# Patient Record
Sex: FEMALE | Race: BLACK OR AFRICAN AMERICAN | NOT HISPANIC OR LATINO | URBAN - METROPOLITAN AREA
[De-identification: names, ages, dates, MRNs, and addresses within clinical notes are randomized per-mention and may not be internally consistent; named-entity substitution may affect disease eponyms.]

---

## 2019-05-05 ENCOUNTER — EMERGENCY (EMERGENCY)
Facility: HOSPITAL | Age: 34
LOS: 1 days | Discharge: HOME | End: 2019-05-05
Admitting: EMERGENCY MEDICINE
Payer: COMMERCIAL

## 2019-05-05 VITALS
SYSTOLIC BLOOD PRESSURE: 132 MMHG | HEART RATE: 77 BPM | TEMPERATURE: 97 F | RESPIRATION RATE: 18 BRPM | DIASTOLIC BLOOD PRESSURE: 97 MMHG | OXYGEN SATURATION: 100 % | WEIGHT: 128.09 LBS

## 2019-05-05 VITALS
SYSTOLIC BLOOD PRESSURE: 131 MMHG | TEMPERATURE: 97 F | OXYGEN SATURATION: 100 % | RESPIRATION RATE: 18 BRPM | HEART RATE: 78 BPM | DIASTOLIC BLOOD PRESSURE: 88 MMHG

## 2019-05-05 PROCEDURE — 70450 CT HEAD/BRAIN W/O DYE: CPT | Mod: 26

## 2019-05-05 PROCEDURE — 73080 X-RAY EXAM OF ELBOW: CPT | Mod: 26,LT

## 2019-05-05 PROCEDURE — 99284 EMERGENCY DEPT VISIT MOD MDM: CPT | Mod: 25

## 2019-05-05 RX ORDER — ONDANSETRON 8 MG/1
4 TABLET, FILM COATED ORAL ONCE
Qty: 0 | Refills: 0 | Status: COMPLETED | OUTPATIENT
Start: 2019-05-05 | End: 2019-05-05

## 2019-05-05 RX ADMIN — ONDANSETRON 4 MILLIGRAM(S): 8 TABLET, FILM COATED ORAL at 14:48

## 2019-05-05 NOTE — ED PROVIDER NOTE - CARE PROVIDER_API CALL
Lloyd Choi)  Orthopaedic Surgery  [address] [place] [address]  Phone: [phone]  Fax: [phone]  Follow Up Time: Lloyd Choi)  Orthopaedic Surgery  [address] [place] [id number]  Phone: [phone]  Fax: [phone]  Follow Up Time:     Peri Castillo)  Neurology  [address] [place] [address]  Phone: [phone]  Fax: [phone]  Follow Up Time:

## 2019-05-05 NOTE — ED PROVIDER NOTE - PHYSICAL EXAMINATION
CONSTITUTIONAL: Well-developed; well-nourished; in no acute distress.  	SKIN: Skin is warm and dry, no acute rash.  	HEAD: Normocephalic; atraumatic.  	EYES: PERRL, EOM intact; conjunctiva and sclera clear.  	ENT: No nasal discharge; airway clear.  no tonsillar swelling or exudates, uvula midline, airway patent. tympanic membranes clear bilaterally, No redness, normal landmarks and light reflexes, canal clear without cerumen impaction, no hemotympanum bilaterally  	NECK: Supple; non tender. no midline tenderness, good ROM neck  	CARD: S1, S2 normal; no murmurs, gallops, or rubs. Regular rate and rhythm.  	RESP: No wheezes, rales or rhonchi.  	ABD:; soft; non-distended; non-tender  	EXT: Normal ROM x 4.  	NEURO: Patient is alert, oriented x person, place and time.  Cranial nerves 2-12 are intact.  Normal gait and speech.  Cerebellar testing normal:  negative Romberg, normal coordination and normal finger to nose, heal to shin and rapid alternating movements.  Normal sensory exam throughout.  No pronator drift.  5/5 bl upper extremity and lower extremity strength. Visual fields intact   PSYCH: Cooperative, appropriate.

## 2019-05-05 NOTE — ED PROVIDER NOTE - NSFOLLOWUPINSTRUCTIONS_ED_ALL_ED_FT
Take Ibuprofen 600mg every 6-8 hours as needed for pain, take with food, and in addition you may take Tylenol 500 mg every 6-8 hours as needed for pain  Rest. Cool compresses.  Extremity elevation.  Ace wrap    RETURN TO THE EMERGENCY DEPARTMENT IF CONFUSION, WORSENING HEADACHE, PERSISTENT VOMITING, WORSENING PAIN/SWELLING OR ANY CONCERNS.

## 2019-05-05 NOTE — ED ADULT NURSE NOTE - NSIMPLEMENTINTERV_GEN_ALL_ED
Implemented All Fall Risk Interventions:  [place] to call system. Call bell, personal items and telephone within reach. Instruct patient to call for assistance. Room bathroom lighting operational. Non-slip footwear when patient is off stretcher. Physically safe environment: no spills, clutter or unnecessary equipment. Stretcher in lowest position, wheels locked, appropriate side rails in place. Provide visual cue, wrist band, yellow gown, etc. Monitor gait and stability. Monitor for mental status changes and reorient to person, place, and time. Review medications for side effects contributing to fall risk. Reinforce activity limits and safety measures with patient and family.

## 2019-05-05 NOTE — ED PROVIDER NOTE - PROVIDER TOKENS
PROVIDER:[TOKEN:[[id number]]] PROVIDER:[TOKEN:[[id number]]],PROVIDER:[TOKEN:[[id number]]]
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
## 2019-05-05 NOTE — ED PROVIDER NOTE - DIAGNOSTIC INTERPRETATION
Interpreted by LESTER DURAN elbow xrays 3 views  No fracture, no dislocation (joint spaces grossly normal), no Foreign Body noted, soft tissue swelling.
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
## 2019-05-09 DIAGNOSIS — S09.90XA UNSPECIFIED INJURY OF HEAD, INITIAL ENCOUNTER: ICD-10-CM
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
## 2019-05-09 DIAGNOSIS — M25.522 PAIN IN LEFT ELBOW: ICD-10-CM
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
## 2020-08-25 ENCOUNTER — APPOINTMENT (RX ONLY)
Dept: URBAN - NONMETROPOLITAN AREA CLINIC 1 | Facility: CLINIC | Age: 35
Setting detail: DERMATOLOGY
End: 2020-08-25

## 2020-08-25 VITALS — TEMPERATURE: 96.8 F

## 2020-08-25 DIAGNOSIS — L70.0 ACNE VULGARIS: ICD-10-CM

## 2020-08-25 DIAGNOSIS — L72.8 OTHER FOLLICULAR CYSTS OF THE SKIN AND SUBCUTANEOUS TISSUE: ICD-10-CM

## 2020-08-25 DIAGNOSIS — Z12.83 ENCOUNTER FOR SCREENING FOR MALIGNANT NEOPLASM OF SKIN: ICD-10-CM

## 2020-08-25 PROBLEM — D23.71 OTHER BENIGN NEOPLASM OF SKIN OF RIGHT LOWER LIMB, INCLUDING HIP: Status: ACTIVE | Noted: 2020-08-25

## 2020-08-25 PROCEDURE — 99203 OFFICE O/P NEW LOW 30 MIN: CPT

## 2020-08-25 PROCEDURE — ? COUNSELING

## 2020-08-25 PROCEDURE — ? OBSERVATION

## 2020-08-25 ASSESSMENT — LOCATION SIMPLE DESCRIPTION DERM
LOCATION SIMPLE: LEFT AXILLARY VAULT
LOCATION SIMPLE: CHEST
LOCATION SIMPLE: RIGHT UPPER BACK
LOCATION SIMPLE: RIGHT EYEBROW

## 2020-08-25 ASSESSMENT — LOCATION ZONE DERM
LOCATION ZONE: FACE
LOCATION ZONE: TRUNK
LOCATION ZONE: AXILLAE

## 2020-08-25 ASSESSMENT — LOCATION DETAILED DESCRIPTION DERM
LOCATION DETAILED: RIGHT LATERAL EYEBROW
LOCATION DETAILED: LEFT MEDIAL SUPERIOR CHEST
LOCATION DETAILED: RIGHT SUPERIOR MEDIAL UPPER BACK
LOCATION DETAILED: LEFT AXILLARY VAULT

## 2020-08-25 NOTE — PROCEDURE: COUNSELING
Detail Level: Generalized
Detail Level: Simple
Topical Retinoid Pregnancy And Lactation Text: This medication is Pregnancy Category C. It is unknown if this medication is excreted in breast milk.
Doxycycline Counseling:  Patient counseled regarding possible photosensitivity and increased risk for sunburn.  Patient instructed to avoid sunlight, if possible.  When exposed to sunlight, patients should wear protective clothing, sunglasses, and sunscreen.  The patient was instructed to call the office immediately if the following severe adverse effects occur:  hearing changes, easy bruising/bleeding, severe headache, or vision changes.  The patient verbalized understanding of the proper use and possible adverse effects of doxycycline.  All of the patient's questions and concerns were addressed.
Erythromycin Counseling:  I discussed with the patient the risks of erythromycin including but not limited to GI upset, allergic reaction, drug rash, diarrhea, increase in liver enzymes, and yeast infections.
High Dose Vitamin A Counseling: Side effects reviewed, pt to contact office should one occur.
Include Pregnancy/Lactation Warning?: No
Tetracycline Counseling: Patient counseled regarding possible photosensitivity and increased risk for sunburn.  Patient instructed to avoid sunlight, if possible.  When exposed to sunlight, patients should wear protective clothing, sunglasses, and sunscreen.  The patient was instructed to call the office immediately if the following severe adverse effects occur:  hearing changes, easy bruising/bleeding, severe headache, or vision changes.  The patient verbalized understanding of the proper use and possible adverse effects of tetracycline.  All of the patient's questions and concerns were addressed. Patient understands to avoid pregnancy while on therapy due to potential birth defects.
Azithromycin Counseling:  I discussed with the patient the risks of azithromycin including but not limited to GI upset, allergic reaction, drug rash, diarrhea, and yeast infections.
Sarecycline Counseling: Patient advised regarding possible photosensitivity and discoloration of the teeth, skin, lips, tongue and gums.  Patient instructed to avoid sunlight, if possible.  When exposed to sunlight, patients should wear protective clothing, sunglasses, and sunscreen.  The patient was instructed to call the office immediately if the following severe adverse effects occur:  hearing changes, easy bruising/bleeding, severe headache, or vision changes.  The patient verbalized understanding of the proper use and possible adverse effects of sarecycline.  All of the patient's questions and concerns were addressed.
Topical Clindamycin Pregnancy And Lactation Text: This medication is Pregnancy Category B and is considered safe during pregnancy. It is unknown if it is excreted in breast milk.
High Dose Vitamin A Pregnancy And Lactation Text: High dose vitamin A therapy is contraindicated during pregnancy and breast feeding.
Detail Level: Zone
Topical Sulfur Applications Counseling: Topical Sulfur Counseling: Patient counseled that this medication may cause skin irritation or allergic reactions.  In the event of skin irritation, the patient was advised to reduce the amount of the drug applied or use it less frequently.   The patient verbalized understanding of the proper use and possible adverse effects of topical sulfur application.  All of the patient's questions and concerns were addressed.
Benzoyl Peroxide Counseling: Patient counseled that medicine may cause skin irritation and bleach clothing.  In the event of skin irritation, the patient was advised to reduce the amount of the drug applied or use it less frequently.   The patient verbalized understanding of the proper use and possible adverse effects of benzoyl peroxide.  All of the patient's questions and concerns were addressed.
Tazorac Counseling:  Patient advised that medication is irritating and drying.  Patient may need to apply sparingly and wash off after an hour before eventually leaving it on overnight.  The patient verbalized understanding of the proper use and possible adverse effects of tazorac.  All of the patient's questions and concerns were addressed.
Erythromycin Pregnancy And Lactation Text: This medication is Pregnancy Category B and is considered safe during pregnancy. It is also excreted in breast milk.
Birth Control Pills Counseling: Birth Control Pill Counseling: I discussed with the patient the potential side effects of OCPs including but not limited to increased risk of stroke, heart attack, thrombophlebitis, deep venous thrombosis, hepatic adenomas, breast changes, GI upset, headaches, and depression.  The patient verbalized understanding of the proper use and possible adverse effects of OCPs. All of the patient's questions and concerns were addressed.
Tetracycline Pregnancy And Lactation Text: This medication is Pregnancy Category D and not consider safe during pregnancy. It is also excreted in breast milk.
Azithromycin Pregnancy And Lactation Text: This medication is considered safe during pregnancy and is also secreted in breast milk.
Isotretinoin Counseling: Patient should get monthly blood tests, not donate blood, not drive at night if vision affected, not share medication, and not undergo elective surgery for 6 months after tx completed. Side effects reviewed, pt to contact office should one occur.
Dapsone Counseling: I discussed with the patient the risks of dapsone including but not limited to hemolytic anemia, agranulocytosis, rashes, methemoglobinemia, kidney failure, peripheral neuropathy, headaches, GI upset, and liver toxicity.  Patients who start dapsone require monitoring including baseline LFTs and weekly CBCs for the first month, then every month thereafter.  The patient verbalized understanding of the proper use and possible adverse effects of dapsone.  All of the patient's questions and concerns were addressed.
Tazorac Pregnancy And Lactation Text: This medication is not safe during pregnancy. It is unknown if this medication is excreted in breast milk.
Bactrim Counseling:  I discussed with the patient the risks of sulfa antibiotics including but not limited to GI upset, allergic reaction, drug rash, diarrhea, dizziness, photosensitivity, and yeast infections.  Rarely, more serious reactions can occur including but not limited to aplastic anemia, agranulocytosis, methemoglobinemia, blood dyscrasias, liver or kidney failure, lung infiltrates or desquamative/blistering drug rashes.
Spironolactone Counseling: Patient advised regarding risks of diarrhea, abdominal pain, hyperkalemia, birth defects (for female patients), liver toxicity and renal toxicity. The patient may need blood work to monitor liver and kidney function and potassium levels while on therapy. The patient verbalized understanding of the proper use and possible adverse effects of spironolactone.  All of the patient's questions and concerns were addressed.
Topical Sulfur Applications Pregnancy And Lactation Text: This medication is Pregnancy Category C and has an unknown safety profile during pregnancy. It is unknown if this topical medication is excreted in breast milk.
Minocycline Counseling: Patient advised regarding possible photosensitivity and discoloration of the teeth, skin, lips, tongue and gums.  Patient instructed to avoid sunlight, if possible.  When exposed to sunlight, patients should wear protective clothing, sunglasses, and sunscreen.  The patient was instructed to call the office immediately if the following severe adverse effects occur:  hearing changes, easy bruising/bleeding, severe headache, or vision changes.  The patient verbalized understanding of the proper use and possible adverse effects of minocycline.  All of the patient's questions and concerns were addressed.
Birth Control Pills Pregnancy And Lactation Text: This medication should be avoided if pregnant and for the first 30 days post-partum.
Topical Clindamycin Counseling: Patient counseled that this medication may cause skin irritation or allergic reactions.  In the event of skin irritation, the patient was advised to reduce the amount of the drug applied or use it less frequently.   The patient verbalized understanding of the proper use and possible adverse effects of clindamycin.  All of the patient's questions and concerns were addressed.
Isotretinoin Pregnancy And Lactation Text: This medication is Pregnancy Category X and is considered extremely dangerous during pregnancy. It is unknown if it is excreted in breast milk.
Dapsone Pregnancy And Lactation Text: This medication is Pregnancy Category C and is not considered safe during pregnancy or breast feeding.
Doxycycline Pregnancy And Lactation Text: This medication is Pregnancy Category D and not consider safe during pregnancy. It is also excreted in breast milk but is considered safe for shorter treatment courses.
Topical Retinoid counseling:  Patient advised to apply a pea-sized amount only at bedtime and wait 30 minutes after washing their face before applying.  If too drying, patient may add a non-comedogenic moisturizer. The patient verbalized understanding of the proper use and possible adverse effects of retinoids.  All of the patient's questions and concerns were addressed.
Benzoyl Peroxide Pregnancy And Lactation Text: This medication is Pregnancy Category C. It is unknown if benzoyl peroxide is excreted in breast milk.
Bactrim Pregnancy And Lactation Text: This medication is Pregnancy Category D and is known to cause fetal risk.  It is also excreted in breast milk.
Spironolactone Pregnancy And Lactation Text: This medication can cause feminization of the male fetus and should be avoided during pregnancy. The active metabolite is also found in breast milk.

## 2020-09-04 ENCOUNTER — APPOINTMENT (RX ONLY)
Dept: URBAN - NONMETROPOLITAN AREA CLINIC 1 | Facility: CLINIC | Age: 35
Setting detail: DERMATOLOGY
End: 2020-09-04

## 2020-09-04 VITALS — TEMPERATURE: 98.1 F

## 2020-09-04 DIAGNOSIS — B00.1 HERPESVIRAL VESICULAR DERMATITIS: ICD-10-CM

## 2020-09-04 DIAGNOSIS — L90.5 SCAR CONDITIONS AND FIBROSIS OF SKIN: ICD-10-CM

## 2020-09-04 PROCEDURE — ? MICRONEEDLING

## 2020-09-04 PROCEDURE — ? ADDITIONAL NOTES

## 2020-09-04 PROCEDURE — ? COUNSELING

## 2020-09-04 PROCEDURE — ? PRESCRIPTION

## 2020-09-04 RX ORDER — VALACYCLOVIR 1 G/1
2 TABLET ORAL BID
Qty: 30 | Refills: 3 | Status: ERX | COMMUNITY
Start: 2020-09-04

## 2020-09-04 RX ADMIN — VALACYCLOVIR 2: 1 TABLET ORAL at 00:00

## 2020-09-04 ASSESSMENT — LOCATION SIMPLE DESCRIPTION DERM
LOCATION SIMPLE: RIGHT LIP
LOCATION SIMPLE: LEFT LIP

## 2020-09-04 ASSESSMENT — LOCATION DETAILED DESCRIPTION DERM
LOCATION DETAILED: RIGHT INFERIOR VERMILION LIP
LOCATION DETAILED: LEFT INFERIOR VERMILION LIP

## 2020-09-04 ASSESSMENT — LOCATION ZONE DERM: LOCATION ZONE: LIP

## 2020-09-04 NOTE — PROCEDURE: ADDITIONAL NOTES
Detail Level: Simple
Additional Notes: Skin Pen Microneedling Treatment\\nPre and Post Procedure Instructions\\nPre-Procedure\\nFor best results and minimal downtime, it is recommended that the patient pre-treat their skin twice per day for up to two weeks with Alastin Skin Regenerating Nectar.  The patient will continue to use the Nectar for up to two weeks post treatment for best results.  \\nPrior to procedure precautions: \\n• Avoid excessive sun exposure/sunburns 24 hours prior to procedure\\n• Discontinue use of topical retinoids 72 hours prior to procedure\\n• Allow at least 24 hours after autoimmune therapies before Skin Pen treatment\\n• Wait 6 months following oral isotretinoin use\\n• If you experience cold sores please inform your practitioner; we can provide you with a prescription anti-viral medication to suppress the possibility of an outbreak\\nDuring the Procedure\\n1. The patient’s skin will be prepped with topical numbing agent. \\n2. Patient’s skin will be cleansed and a glide product applied to the skin for treatment. \\n3. Treatment with the microneedling device will take approximately 30 to 45 minutes. \\n4. Alastin Skin Regenerating Nectar will be applied to the skin post treatment. \\nPost Procedure Instructions and Expectations\\n It is important the patient understands and follows the post procedure instructions.  Microneedling creates “micro channels” where the tiny needle has entered and exited the skin creating an injury.  The micro-channels remain open for the first 24 hours post treatment.  During this 24 hour period, it of the upmost importance the patient applies the correct skin care to avoid complications.  If you wish, you may bring your products into the office so we may determine if they are safe for use.\\nListed below are product ingredients which the patient SHOULD NOT APPLY to their skin while the micro-channels are open:\\nProducts to AVOID for the first 24 hours post treatment: \\n• Silicone\\n• Dimethicone\\n• Methicone\\n• Dyes\\n• Fragrance\\n• Petrolatum or Petroleum \\n• Glycol or propylene glycol\\n• Mineral oil\\n• Sulphates, such as Sodium Millie Sulphates or derivatives\\n• Parabens\\n• Citrus-derived preservatives\\n• Formaldehyde releasing preservatives\\n \\nProducts to consider for optimal results post treatment and are APPROVED for the application in the first 24 hours: \\n• Vitamin C\\n• Vitamin E\\n• Peptides\\n• Hyaluronic Acid\\n• Magnesium\\n• Growth Factors\\nPost Procedure Home Care\\nThe First 24 hours Post Treatment\\n1. WASH with a gentle cleanser and water, morning and night, lightly pat skin dry. \\n2. APPLY only Alastin [place] or other approved skin care for the first 24 hours post procedure. \\n3. MOISTURIZE with a soothing balm or appropriate moisturizer after the Nectar product.  \\na. You may reapply the moisturizer or soothing balm multiple times per day to soothe irritation and promote moisture in the skin. \\n4. AVOID sun for the first 24 hours post treatment.  \\n5. AVOID Strenuous exercise or excessive perspiration 24-48 hours post treatment.  \\na. Excessive blood flow and sweat can cause irritation and discomfort to the treatment area. \\n48 Hours to 2 Weeks Post Treatment\\n1. WASH with a gentle cleanser and water, morning and night, lightly pat skin dry. \\n2. APPLY Alastin [place], morning and night, for up to 2 weeks post treatment or until the skin has fully healed. \\n3. MOISTURIZE with a soothing balm or appropriate moisturizer after the Nectar product.  \\na. You may reapply the moisturizer or soothing balm multiple times per day to soothe irritation and promote moisture in the skin. \\nb. AVOID Home care corrective products, such as Citric Acid, Retinols/Retinoids, Alpha Hydroxy Acids, Beta Hydroxy Acids, scrubs, exfoliating products, cleansing brushes (such as the Clarisonic Brush), or other products considered to have active ingredients should be avoided until the skin is fully healed. \\n4. PROTECT your investment by applying a high quality, physical sunscreen.  \\na. Chemical sunscreens of any kind are not recommended until the skin has fully healed.  \\nb. If sun exposure cannot be avoided, assure you are reapplying the physical sunscreen every 90 minutes and protect the treatment area with clothing (a wide-brimmed hat, a buff, etc.). \\n5. RESUME your normal skin care routine once the skin has fully healed and discontinue the use of the Alastin Skin Regenerating Nectar product. \\nIf you have any questions or concerns about your treatment please contact the [facility] office at \\[phone].  Thank you!

## 2020-09-04 NOTE — PROCEDURE: MICRONEEDLING
Speed (Location #2): high
Location #2: Forehead/chin
Post-Care Instructions: Detailed post care reviewed and pt provided written post care instructions including a detailed list of products to avoid for the first 24 hours post tx.  After the initial 24 hour period, pt should use high quality, gentle moisturizer, and protect the treatment area with sunscreen.  Pt educated to continue use of high quality products and avoid retinoid type products until the skin is fully healed at which time they can return to their normal home care routine.  \\nPt verbalized understanding.
Consent: Written consent reviewed by RN and signed by pt.  Risks reviewed including but not limited to pain, swelling, scarring, infection, and incomplete improvement. \\Tosha pt concerns and questions addressed and pt verbalized understanding.
Topical Anesthesia?: 23% lidocaine, 7% tetracaine
Depth In Mm (Location #2): 1
Location #1: Cheeks/chin/chest
Depth In Mm (Location #3): 2.5
Length Of Topical Anesthesia Application (Optional): 15 minutes
Location #3: scarring
Price (Use Numbers Only, No Special Characters Or $): 870
Depth In Mm (Location #1): 2
Depth In Mm (Location #4): 0.25
Location #4: Under eye
Detail Level: Zone

## 2020-09-25 ENCOUNTER — APPOINTMENT (RX ONLY)
Dept: URBAN - NONMETROPOLITAN AREA CLINIC 1 | Facility: CLINIC | Age: 35
Setting detail: DERMATOLOGY
End: 2020-09-25

## 2020-09-25 DIAGNOSIS — Z41.9 ENCOUNTER FOR PROCEDURE FOR PURPOSES OTHER THAN REMEDYING HEALTH STATE, UNSPECIFIED: ICD-10-CM

## 2020-09-25 DIAGNOSIS — L90.5 SCAR CONDITIONS AND FIBROSIS OF SKIN: ICD-10-CM

## 2020-09-25 PROCEDURE — ? MICRONEEDLING

## 2020-09-25 PROCEDURE — ? ADDITIONAL NOTES

## 2020-09-25 PROCEDURE — ? COSMETIC FOLLOW-UP

## 2020-09-25 NOTE — PROCEDURE: MICRONEEDLING
Speed (Location #2): high
Location #2: Forehead
Post-Care Instructions: Detailed post care reviewed and pt provided written post care instructions including a detailed list of products to avoid for the first 24 hours post tx.  After the initial 24 hour period, pt should use high quality, gentle moisturizer, and protect the treatment area with sunscreen.  Pt educated to continue use of high quality products and avoid retinoid type products until the skin is fully healed at which time they can return to their normal home care routine.  \\nPt verbalized understanding.
Consent: Written consent reviewed by RN and signed by pt.  Risks reviewed including but not limited to pain, swelling, scarring, infection, and incomplete improvement. \\Tosha pt concerns and questions addressed and pt verbalized understanding.
Topical Anesthesia?: 23% lidocaine, 7% tetracaine
Depth In Mm (Location #2): 1
Location #1: Cheeks/chin/chest/ back
Depth In Mm (Location #3): 0.5
Length Of Topical Anesthesia Application (Optional): 15 minutes
Location #3: under eye/nose
Price (Use Numbers Only, No Special Characters Or $): 076
Depth In Mm (Location #1): 2
Detail Level: Zone

## 2020-09-25 NOTE — PROCEDURE: COSMETIC FOLLOW-UP
Price (Use Numbers Only, No Special Characters Or $): 0
Comments (Free Text): The pt and I noted an improvement is scarring on the chest but was unsure of the result for her face. Pt states she knows correcting acne scarring is a process and wants to continue with the treatment plan.
Patient Satisfaction: Unsure
Global Improvement: Modest
Detail Level: Zone
Treatment Override (Free Text): microneedling

## 2020-09-25 NOTE — PROCEDURE: ADDITIONAL NOTES
Detail Level: Simple
Additional Notes: Skin Pen Microneedling Treatment\\nPre and Post Procedure Instructions\\nPre-Procedure\\nFor best results and minimal downtime, it is recommended that the patient pre-treat their skin twice per day for up to two weeks with Alastin Skin Regenerating Nectar.  The patient will continue to use the Nectar for up to two weeks post treatment for best results.  \\nPrior to procedure precautions: \\n• Avoid excessive sun exposure/sunburns 24 hours prior to procedure\\n• Discontinue use of topical retinoids 72 hours prior to procedure\\n• Allow at least 24 hours after autoimmune therapies before Skin Pen treatment\\n• Wait 6 months following oral isotretinoin use\\n• If you experience cold sores please inform your practitioner; we can provide you with a prescription anti-viral medication to suppress the possibility of an outbreak\\nDuring the Procedure\\n1. The patient’s skin will be prepped with topical numbing agent. \\n2. Patient’s skin will be cleansed and a glide product applied to the skin for treatment. \\n3. Treatment with the microneedling device will take approximately 30 to 45 minutes. \\n4. Alastin Skin Regenerating Nectar will be applied to the skin post treatment. \\nPost Procedure Instructions and Expectations\\n It is important the patient understands and follows the post procedure instructions.  Microneedling creates “micro channels” where the tiny needle has entered and exited the skin creating an injury.  The micro-channels remain open for the first 24 hours post treatment.  During this 24 hour period, it of the upmost importance the patient applies the correct skin care to avoid complications.  If you wish, you may bring your products into the office so we may determine if they are safe for use.\\nListed below are product ingredients which the patient SHOULD NOT APPLY to their skin while the micro-channels are open:\\nProducts to AVOID for the first 24 hours post treatment: \\n• Silicone\\n• Dimethicone\\n• Methicone\\n• Dyes\\n• Fragrance\\n• Petrolatum or Petroleum \\n• Glycol or propylene glycol\\n• Mineral oil\\n• Sulphates, such as Sodium Millie Sulphates or derivatives\\n• Parabens\\n• Citrus-derived preservatives\\n• Formaldehyde releasing preservatives\\n \\nProducts to consider for optimal results post treatment and are APPROVED for the application in the first 24 hours: \\n• Vitamin C\\n• Vitamin E\\n• Peptides\\n• Hyaluronic Acid\\n• Magnesium\\n• Growth Factors\\nPost Procedure Home Care\\nThe First 24 hours Post Treatment\\n1. WASH with a gentle cleanser and water, morning and night, lightly pat skin dry. \\n2. APPLY only Alastin [place] or other approved skin care for the first 24 hours post procedure. \\n3. MOISTURIZE with a soothing balm or appropriate moisturizer after the Nectar product.  \\na. You may reapply the moisturizer or soothing balm multiple times per day to soothe irritation and promote moisture in the skin. \\n4. AVOID sun for the first 24 hours post treatment.  \\n5. AVOID Strenuous exercise or excessive perspiration 24-48 hours post treatment.  \\na. Excessive blood flow and sweat can cause irritation and discomfort to the treatment area. \\n48 Hours to 2 Weeks Post Treatment\\n1. WASH with a gentle cleanser and water, morning and night, lightly pat skin dry. \\n2. APPLY Alastin [place], morning and night, for up to 2 weeks post treatment or until the skin has fully healed. \\n3. MOISTURIZE with a soothing balm or appropriate moisturizer after the Nectar product.  \\na. You may reapply the moisturizer or soothing balm multiple times per day to soothe irritation and promote moisture in the skin. \\nb. AVOID Home care corrective products, such as Citric Acid, Retinols/Retinoids, Alpha Hydroxy Acids, Beta Hydroxy Acids, scrubs, exfoliating products, cleansing brushes (such as the Clarisonic Brush), or other products considered to have active ingredients should be avoided until the skin is fully healed. \\n4. PROTECT your investment by applying a high quality, physical sunscreen.  \\na. Chemical sunscreens of any kind are not recommended until the skin has fully healed.  \\nb. If sun exposure cannot be avoided, assure you are reapplying the physical sunscreen every 90 minutes and protect the treatment area with clothing (a wide-brimmed hat, a buff, etc.). \\n5. RESUME your normal skin care routine once the skin has fully healed and discontinue the use of the Alastin Skin Regenerating Nectar product. \\nIf you have any questions or concerns about your treatment please contact the [facility] [place] at \\[phone].  Thank you!

## 2020-10-02 VITALS — TEMPERATURE: 97.7 F

## 2020-11-06 ENCOUNTER — APPOINTMENT (RX ONLY)
Dept: URBAN - NONMETROPOLITAN AREA CLINIC 1 | Facility: CLINIC | Age: 35
Setting detail: DERMATOLOGY
End: 2020-11-06

## 2020-11-06 VITALS — TEMPERATURE: 97.9 F

## 2020-11-06 DIAGNOSIS — L90.5 SCAR CONDITIONS AND FIBROSIS OF SKIN: ICD-10-CM

## 2020-11-06 PROCEDURE — ? MICRONEEDLING

## 2020-11-06 PROCEDURE — ? ADDITIONAL NOTES

## 2020-11-06 NOTE — PROCEDURE: MICRONEEDLING
Speed (Location #5): high
Topical Anesthesia?: 23% lidocaine, 7% tetracaine
Detail Level: Zone
Depth In Mm (Location #1): 2.25
Treatment Number (Optional): 3
Location #5: Chest & Back
Depth In Mm (Location #3): 1.5
Price (Use Numbers Only, No Special Characters Or $): 300
Depth In Mm (Location #4): 0.5
Post-Care Instructions: Detailed post care reviewed and pt provided written post care instructions including a detailed list of products to avoid for the first 24 hours post tx.  After the initial 24 hour period, pt should use high quality, gentle moisturizer, and protect the treatment area with sunscreen.  Pt educated to continue use of high quality products and avoid retinoid type products until the skin is fully healed at which time they can return to their normal home care routine.  \\nPt verbalized understanding.
Length Of Topical Anesthesia Application (Optional): 15 minutes
Depth In Mm (Location #5): 2
Location #1: Cheeks
Location #2: Forehead
Location #3: Nose/Chin
Consent: Written consent reviewed by RN and signed by pt.  Risks reviewed including but not limited to pain, swelling, scarring, infection, and incomplete improvement. \\Tosha pt concerns and questions addressed and pt verbalized understanding.
Location #4: Under eye

## 2020-11-06 NOTE — PROCEDURE: ADDITIONAL NOTES
Additional Notes: Skin Pen Microneedling Treatment\\nPre and Post Procedure Instructions\\nPre-Procedure\\nFor best results and minimal downtime, it is recommended that the patient pre-treat their skin twice per day for up to two weeks with Alastin Skin Regenerating Nectar.  The patient will continue to use the Nectar for up to two weeks post treatment for best results.  \\nPrior to procedure precautions: \\n• Avoid excessive sun exposure/sunburns 24 hours prior to procedure\\n• Discontinue use of topical retinoids 72 hours prior to procedure\\n• Allow at least 24 hours after autoimmune therapies before Skin Pen treatment\\n• Wait 6 months following oral isotretinoin use\\n• If you experience cold sores please inform your practitioner; we can provide you with a prescription anti-viral medication to suppress the possibility of an outbreak\\nDuring the Procedure\\n1. The patient’s skin will be prepped with topical numbing agent. \\n2. Patient’s skin will be cleansed and a glide product applied to the skin for treatment. \\n3. Treatment with the microneedling device will take approximately 30 to 45 minutes. \\n4. Alastin Skin Regenerating Nectar will be applied to the skin post treatment. \\nPost Procedure Instructions and Expectations\\n It is important the patient understands and follows the post procedure instructions.  Microneedling creates “micro channels” where the tiny needle has entered and exited the skin creating an injury.  The micro-channels remain open for the first 24 hours post treatment.  During this 24 hour period, it of the upmost importance the patient applies the correct skin care to avoid complications.  If you wish, you may bring your products into the office so we may determine if they are safe for use.\\nListed below are product ingredients which the patient SHOULD NOT APPLY to their skin while the micro-channels are open:\\nProducts to AVOID for the first 24 hours post treatment: \\n• Silicone\\n• Dimethicone\\n• Methicone\\n• Dyes\\n• Fragrance\\n• Petrolatum or Petroleum \\n• Glycol or propylene glycol\\n• Mineral oil\\n• Sulphates, such as Sodium Millie Sulphates or derivatives\\n• Parabens\\n• Citrus-derived preservatives\\n• Formaldehyde releasing preservatives\\n \\nProducts to consider for optimal results post treatment and are APPROVED for the application in the first 24 hours: \\n• Vitamin C\\n• Vitamin E\\n• Peptides\\n• Hyaluronic Acid\\n• Magnesium\\n• Growth Factors\\nPost Procedure Home Care\\nThe First 24 hours Post Treatment\\n1. WASH with a gentle cleanser and water, morning and night, lightly pat skin dry. \\n2. APPLY only Alastin [place] or other approved skin care for the first 24 hours post procedure. \\n3. MOISTURIZE with a soothing balm or appropriate moisturizer after the Nectar product.  \\na. You may reapply the moisturizer or soothing balm multiple times per day to soothe irritation and promote moisture in the skin. \\n4. AVOID sun for the first 24 hours post treatment.  \\n5. AVOID Strenuous exercise or excessive perspiration 24-48 hours post treatment.  \\na. Excessive blood flow and sweat can cause irritation and discomfort to the treatment area. \\n48 Hours to 2 Weeks Post Treatment\\n1. WASH with a gentle cleanser and water, morning and night, lightly pat skin dry. \\n2. APPLY Alastin [place], morning and night, for up to 2 weeks post treatment or until the skin has fully healed. \\n3. MOISTURIZE with a soothing balm or appropriate moisturizer after the Nectar product.  \\na. You may reapply the moisturizer or soothing balm multiple times per day to soothe irritation and promote moisture in the skin. \\nb. AVOID Home care corrective products, such as Citric Acid, Retinols/Retinoids, Alpha Hydroxy Acids, Beta Hydroxy Acids, scrubs, exfoliating products, cleansing brushes (such as the Clarisonic Brush), or other products considered to have active ingredients should be avoided until the skin is fully healed. \\n4. PROTECT your investment by applying a high quality, physical sunscreen.  \\na. Chemical sunscreens of any kind are not recommended until the skin has fully healed.  \\nb. If sun exposure cannot be avoided, assure you are reapplying the physical sunscreen every 90 minutes and protect the treatment area with clothing (a wide-brimmed hat, a buff, etc.). \\n5. RESUME your normal skin care routine once the skin has fully healed and discontinue the use of the Alastin Skin Regenerating Nectar product. \\nIf you have any questions or concerns about your treatment please contact the [facility] [place] office at \\[phone].  Thank you!
Detail Level: Simple

## 2021-09-28 NOTE — ED ADULT NURSE NOTE - DISCHARGE DATE/TIME
05-May-2019 16:09 Quinolones Counseling:  I discussed with the patient the risks of fluoroquinolones including but not limited to GI upset, allergic reaction, drug rash, diarrhea, dizziness, photosensitivity, yeast infections, liver function test abnormalities, tendonitis/tendon rupture.